# Patient Record
(demographics unavailable — no encounter records)

---

## 2025-03-03 NOTE — PLAN
[FreeTextEntry3] : Follow up in 2- weeks to see how Bertha's symptoms have evolved continue with current class placement and related services  [Family Questions] : Family's questions were addressed [Sleep] : The importance of sleep and strategies to ensure adequate sleep

## 2025-03-03 NOTE — REASON FOR VISIT
[Mother] : mother [Medical Office: ([facility])___] : at the medical office located in  [Follow-Up Visit] : a follow-up visit [Telehealth (audio & video)] : This visit was provided via telehealth using real-time 2-way audio visual technology. [FreeTextEntry2] : developmental follow up  [FreeTextEntry3] : August 2024 for initial evaluation

## 2025-03-03 NOTE — REASON FOR VISIT
[Mother] : mother [Medical Office: ([facility])___] : at the medical office located in  [Follow-Up Visit] : a follow-up visit [Telehealth (audio & video)] : This visit was provided via telehealth using real-time 2-way audio visual technology. [FreeTextEntry2] : developmental follow up  [FreeTextEntry3] : August 2024 for initial evaluation

## 2025-03-03 NOTE — HISTORY OF PRESENT ILLNESS
[FreeTextEntry5] : Grade: Pre-K.   Special Education: Individualized Education Program   Classification: Preschooler with a Disability   Therapy: Occupational Therapy 2x per week, Physical Therapy 2x per week, Speech/Language Therapy 2x week   Other Accommodations & Services: attends [employer] [facility] in a 6:1:2 [FreeTextEntry1] : Bertha is a 4-year-3-month-old girl with [id number] neuro-developmental disorder, autism, global developmental delay and language disorder for follow up. Bertha continues to get related services in school. The past 2-months have been very challenging with sleep and head banging.  The past month she has been banging her head a lot.  She hasn't been sleeping well. Waking up in the middle of the night. sometimes she needs to be patted and give her pacier and go back to sleep but other times, but she is up for 2-3 hours at a time. She has bene awake. Spoke to neurologist: did 24 hr EEG, regular EEG and were normal. Sleep study- showed some intermittent apnea but not impressive.  Recommended ENT; adenoids are a little enlarged but not enough for surgery.  A week ago, her neurologist switched to klonopin- 0.5 mg give a little before bedtime-mom feels this has made sleep and head banging worse.  Nothing calms her. The other night mom had to take her for a stroll.  Teacher reports that she is behaving at baseline at school despite her lack of sleep.  The only change at home is that her aide at home changed- but that was just a week at home.   [Major Illness] : no major illness [Major Injury] : no major injury [Surgery] : no surgery [Hospitalizations] : no hospitalizations [New Medications] : new medications

## 2025-03-03 NOTE — HISTORY OF PRESENT ILLNESS
[FreeTextEntry5] : Grade: Pre-K.   Special Education: Individualized Education Program   Classification: Preschooler with a Disability   Therapy: Occupational Therapy 2x per week, Physical Therapy 2x per week, Speech/Language Therapy 2x week   Other Accommodations & Services: attends [employer] [facility] [facility] in a 6:1:2 [FreeTextEntry1] : Bertha is a 4-year-3-month-old girl with [id number] neuro-developmental disorder, autism, global developmental delay and language disorder for follow up. Bertha continues to get related services in school. The past 2-months have been very challenging with sleep and head banging.  The past month she has been banging her head a lot.  She hasn't been sleeping well. Waking up in the middle of the night. sometimes she needs to be patted and give her pacier and go back to sleep but other times, but she is up for 2-3 hours at a time. She has bene awake. Spoke to neurologist: did 24 hr EEG, regular EEG and were normal. Sleep study- showed some intermittent apnea but not impressive.  Recommended ENT; adenoids are a little enlarged but not enough for surgery.  A week ago, her neurologist switched to klonopin- 0.5 mg give a little before bedtime-mom feels this has made sleep and head banging worse.  Nothing calms her. The other night mom had to take her for a stroll.  Teacher reports that she is behaving at baseline at school despite her lack of sleep.  The only change at home is that her aide at home changed- but that was just a week at home.   [Major Illness] : no major illness [Major Injury] : no major injury [Surgery] : no surgery [Hospitalizations] : no hospitalizations [New Medications] : new medications